# Patient Record
Sex: MALE | Race: WHITE | Employment: UNEMPLOYED | URBAN - METROPOLITAN AREA
[De-identification: names, ages, dates, MRNs, and addresses within clinical notes are randomized per-mention and may not be internally consistent; named-entity substitution may affect disease eponyms.]

---

## 2021-04-25 ENCOUNTER — IMMUNIZATION (OUTPATIENT)
Dept: LAB | Facility: HOSPITAL | Age: 46
End: 2021-04-25
Attending: EMERGENCY MEDICINE
Payer: MEDICAID

## 2021-04-25 DIAGNOSIS — Z23 NEED FOR VACCINATION: Primary | ICD-10-CM

## 2021-04-25 PROCEDURE — 0011A SARSCOV2 VAC 100MCG/0.5ML IM: CPT

## 2021-05-23 ENCOUNTER — IMMUNIZATION (OUTPATIENT)
Dept: LAB | Facility: HOSPITAL | Age: 46
End: 2021-05-23
Attending: EMERGENCY MEDICINE
Payer: MEDICAID

## 2021-05-23 DIAGNOSIS — Z23 NEED FOR VACCINATION: Primary | ICD-10-CM

## 2021-05-23 PROCEDURE — 0012A SARSCOV2 VAC 100MCG/0.5ML IM: CPT

## 2021-11-09 ENCOUNTER — APPOINTMENT (OUTPATIENT)
Dept: GENERAL RADIOLOGY | Facility: HOSPITAL | Age: 46
End: 2021-11-09
Attending: EMERGENCY MEDICINE
Payer: MEDICAID

## 2021-11-09 ENCOUNTER — HOSPITAL ENCOUNTER (EMERGENCY)
Facility: HOSPITAL | Age: 46
Discharge: LEFT BEFORE DISCHARGE | End: 2021-11-09
Attending: EMERGENCY MEDICINE
Payer: MEDICAID

## 2021-11-09 ENCOUNTER — HOSPITAL ENCOUNTER (INPATIENT)
Facility: HOSPITAL | Age: 46
LOS: 2 days | Discharge: HOME | DRG: 189 | End: 2021-11-11
Attending: EMERGENCY MEDICINE | Admitting: HOSPITALIST
Payer: MEDICAID

## 2021-11-09 VITALS
SYSTOLIC BLOOD PRESSURE: 145 MMHG | BODY MASS INDEX: 26.51 KG/M2 | OXYGEN SATURATION: 95 % | HEIGHT: 73 IN | RESPIRATION RATE: 41 BRPM | TEMPERATURE: 97 F | DIASTOLIC BLOOD PRESSURE: 92 MMHG | HEART RATE: 115 BPM | WEIGHT: 200 LBS

## 2021-11-09 VITALS
OXYGEN SATURATION: 93 % | TEMPERATURE: 98 F | WEIGHT: 200 LBS | HEART RATE: 118 BPM | BODY MASS INDEX: 26 KG/M2 | RESPIRATION RATE: 30 BRPM | DIASTOLIC BLOOD PRESSURE: 110 MMHG | SYSTOLIC BLOOD PRESSURE: 128 MMHG

## 2021-11-09 DIAGNOSIS — J45.901 ASTHMA WITH ACUTE EXACERBATION, UNSPECIFIED ASTHMA SEVERITY, UNSPECIFIED WHETHER PERSISTENT: Primary | ICD-10-CM

## 2021-11-09 DIAGNOSIS — J45.42 MODERATE PERSISTENT ASTHMA WITH STATUS ASTHMATICUS: Primary | ICD-10-CM

## 2021-11-09 DIAGNOSIS — Z53.29 LEFT AGAINST MEDICAL ADVICE: ICD-10-CM

## 2021-11-09 DIAGNOSIS — J45.901 SEVERE ASTHMA WITH EXACERBATION, UNSPECIFIED WHETHER PERSISTENT: Primary | ICD-10-CM

## 2021-11-09 PROCEDURE — 80048 BASIC METABOLIC PNL TOTAL CA: CPT | Performed by: EMERGENCY MEDICINE

## 2021-11-09 PROCEDURE — 5A09357 ASSISTANCE WITH RESPIRATORY VENTILATION, LESS THAN 24 CONSECUTIVE HOURS, CONTINUOUS POSITIVE AIRWAY PRESSURE: ICD-10-PCS | Performed by: HOSPITALIST

## 2021-11-09 PROCEDURE — 71045 X-RAY EXAM CHEST 1 VIEW: CPT | Performed by: EMERGENCY MEDICINE

## 2021-11-09 PROCEDURE — 96365 THER/PROPH/DIAG IV INF INIT: CPT

## 2021-11-09 PROCEDURE — 93010 ELECTROCARDIOGRAM REPORT: CPT | Performed by: EMERGENCY MEDICINE

## 2021-11-09 PROCEDURE — 96375 TX/PRO/DX INJ NEW DRUG ADDON: CPT

## 2021-11-09 PROCEDURE — 93005 ELECTROCARDIOGRAM TRACING: CPT

## 2021-11-09 PROCEDURE — 99222 1ST HOSP IP/OBS MODERATE 55: CPT | Performed by: HOSPITALIST

## 2021-11-09 PROCEDURE — 94640 AIRWAY INHALATION TREATMENT: CPT

## 2021-11-09 PROCEDURE — 85025 COMPLETE CBC W/AUTO DIFF WBC: CPT | Performed by: EMERGENCY MEDICINE

## 2021-11-09 PROCEDURE — 99285 EMERGENCY DEPT VISIT HI MDM: CPT

## 2021-11-09 PROCEDURE — 96372 THER/PROPH/DIAG INJ SC/IM: CPT

## 2021-11-09 PROCEDURE — 94660 CPAP INITIATION&MGMT: CPT

## 2021-11-09 PROCEDURE — 99223 1ST HOSP IP/OBS HIGH 75: CPT | Performed by: HOSPITALIST

## 2021-11-09 RX ORDER — GUAIFENESIN 100 MG/5ML
200 SOLUTION ORAL EVERY 4 HOURS PRN
Status: CANCELLED | OUTPATIENT
Start: 2021-11-09

## 2021-11-09 RX ORDER — METHYLPREDNISOLONE SODIUM SUCCINATE 125 MG/2ML
125 INJECTION, POWDER, LYOPHILIZED, FOR SOLUTION INTRAMUSCULAR; INTRAVENOUS ONCE
Status: COMPLETED | OUTPATIENT
Start: 2021-11-09 | End: 2021-11-09

## 2021-11-09 RX ORDER — MAGNESIUM SULFATE HEPTAHYDRATE 40 MG/ML
2 INJECTION, SOLUTION INTRAVENOUS ONCE
Status: COMPLETED | OUTPATIENT
Start: 2021-11-09 | End: 2021-11-09

## 2021-11-09 RX ORDER — IPRATROPIUM BROMIDE AND ALBUTEROL SULFATE 2.5; .5 MG/3ML; MG/3ML
3 SOLUTION RESPIRATORY (INHALATION) ONCE
Status: COMPLETED | OUTPATIENT
Start: 2021-11-09 | End: 2021-11-09

## 2021-11-09 RX ORDER — METHYLPREDNISOLONE SODIUM SUCCINATE 125 MG/2ML
INJECTION, POWDER, LYOPHILIZED, FOR SOLUTION INTRAMUSCULAR; INTRAVENOUS
Status: COMPLETED
Start: 2021-11-09 | End: 2021-11-09

## 2021-11-09 RX ORDER — PROCHLORPERAZINE EDISYLATE 5 MG/ML
5 INJECTION INTRAMUSCULAR; INTRAVENOUS EVERY 8 HOURS PRN
Status: CANCELLED | OUTPATIENT
Start: 2021-11-09

## 2021-11-09 RX ORDER — METHYLPREDNISOLONE SODIUM SUCCINATE 40 MG/ML
40 INJECTION, POWDER, LYOPHILIZED, FOR SOLUTION INTRAMUSCULAR; INTRAVENOUS EVERY 6 HOURS
Status: CANCELLED | OUTPATIENT
Start: 2021-11-09

## 2021-11-09 RX ORDER — ENOXAPARIN SODIUM 100 MG/ML
40 INJECTION SUBCUTANEOUS DAILY
Status: CANCELLED | OUTPATIENT
Start: 2021-11-09

## 2021-11-09 RX ORDER — IPRATROPIUM BROMIDE AND ALBUTEROL SULFATE 2.5; .5 MG/3ML; MG/3ML
3 SOLUTION RESPIRATORY (INHALATION) EVERY 6 HOURS PRN
Status: CANCELLED | OUTPATIENT
Start: 2021-11-09

## 2021-11-09 RX ORDER — TEMAZEPAM 15 MG/1
15 CAPSULE ORAL NIGHTLY PRN
Status: CANCELLED | OUTPATIENT
Start: 2021-11-09

## 2021-11-09 RX ORDER — IPRATROPIUM BROMIDE AND ALBUTEROL SULFATE 2.5; .5 MG/3ML; MG/3ML
3 SOLUTION RESPIRATORY (INHALATION) EVERY 6 HOURS
Status: CANCELLED | OUTPATIENT
Start: 2021-11-09

## 2021-11-09 RX ORDER — PREDNISONE 20 MG/1
40 TABLET ORAL DAILY
Qty: 10 TABLET | Refills: 0 | Status: SHIPPED | OUTPATIENT
Start: 2021-11-09 | End: 2021-11-11

## 2021-11-09 RX ORDER — ALBUTEROL SULFATE 90 UG/1
2 AEROSOL, METERED RESPIRATORY (INHALATION) EVERY 4 HOURS PRN
Qty: 8 G | Refills: 0 | Status: SHIPPED | OUTPATIENT
Start: 2021-11-09 | End: 2021-11-11

## 2021-11-09 RX ORDER — IPRATROPIUM BROMIDE AND ALBUTEROL SULFATE 2.5; .5 MG/3ML; MG/3ML
3 SOLUTION RESPIRATORY (INHALATION) ONCE
Status: DISCONTINUED | OUTPATIENT
Start: 2021-11-09 | End: 2021-11-09

## 2021-11-09 RX ORDER — ACETAMINOPHEN 325 MG/1
650 TABLET ORAL EVERY 6 HOURS PRN
Status: CANCELLED | OUTPATIENT
Start: 2021-11-09

## 2021-11-09 RX ORDER — ONDANSETRON 2 MG/ML
4 INJECTION INTRAMUSCULAR; INTRAVENOUS EVERY 6 HOURS PRN
Status: CANCELLED | OUTPATIENT
Start: 2021-11-09

## 2021-11-09 NOTE — ED PROVIDER NOTES
Patient Seen in: [facility] Emergency Department      History   Patient presents with:  Difficulty Breathing    Stated Complaint: asthma    Subjective:   HPI    Patient is a 39-year-old male who arrives by EMS for asthma exacerbation that occurred Osmolality 297 (*)     All other components within normal limits   CBC W/ DIFFERENTIAL - Abnormal; Notable for the following components:    WBC 15.0 (*)     RBC 5.95 (*)     Neutrophil Absolute Prelim 12.26 (*)     Neutrophil Absolute 12.26 (*)     All oth magnesium IV. Reassessment-air entry to b/l lung fields improved but significant wheezing now heard. D/w patient admission and pt refuses. States he always gets epinephrine in past and then goes home.  He understands that he is leaving AMA and will go h

## 2021-11-09 NOTE — H&P
[facility]    PATIENT'S NAME: Mojganasmita Peñao   ATTENDING PHYSICIAN: Radha Simon.  Viktor Fajardo MD   PATIENT ACCOUNT#:   [id number]    LOCATION:  [place] [address]  MEDICAL RECORD #:   [id number]       YOB: 1975  ADMISSION DATE:       11/09/2021 treatment. HEENT:  Atraumatic. Oropharynx clear. Dry mucous membranes. Ears, nose normal.  Eyes:  Anicteric sclerae. Pupils equal, round, reactive. NECK:  Supple. No lymphadenopathy. Trachea midline. Full range of motion.      LUNGS:  Bilateral e

## 2021-11-09 NOTE — ED QUICK NOTES
Orders for admission, patient is aware of plan and ready to go upstairs. Any questions, please call ED RN Erie Rinne  at extension [phone].    Type of COVID test sent:rapid  COVID Suspicion level: High    Titratable drug(s) infusing:  Rate:    LOC at time of trans

## 2021-11-09 NOTE — ED INITIAL ASSESSMENT (HPI)
Irina arrives via [facility] from home, patient with asthma exacerbation since 10 pm. Tripoding upon arrival, 2 albuterol nebs given by medics, patient used his inhaler 10 times at home. Patient only able to speak in 1 word sentences.

## 2021-11-09 NOTE — ED PROVIDER NOTES
He was  Patient Seen in: [facility] Emergency Department    History   Patient presents with:  Asthma    Stated Complaint: Asthma    HPI    Patient presents to the emergency department by ambulance.  He was seen here last night on the night shift he h (36.3 °C)   Temp src 11/09/21 1600 Skin   SpO2 11/09/21 1553 96 %   O2 Device 11/09/21 1553 None (Room air)       Current:BP (!) 152/95   Pulse 118   Temp 97.4 °F (36.3 °C) (Skin)   Resp 13   Ht 185.4 cm (6' 1\")   Wt 90.7 kg   SpO2 100%   BMI 26.39 kg/m² life-threatening. He is currently saturating 93% on room air he understood everything I said and acknowledge this.   He still wanted to leave [address] I strongly encouraged him to have close follow-up I discussed to return immediately with an MD  [address] [facility]  [phone]    In 1 day  For re-check      Medications Prescribed:  Current Discharge Medication List    START taking these medications    fluticasone propionate 110 MCG/ACT Inhalation Aerosol  Inhale 1 puf

## 2021-11-09 NOTE — ED QUICK NOTES
Orders for admission, patient is aware of plan and ready to go upstairs. Any questions, please call ED DEYSI jones  at extension [phone].    Type of COVID test sent:rapid  COVID Suspicion level: Low      LOC at time of transport:a/ox4    Other pertinent informat

## 2021-11-09 NOTE — ED QUICK NOTES
Pt requesting to leave AMA. Pt states he is feeling better and does not want to stay in the hospital. RT called to dc BIPAP. EPI IM given per md order. This RN explained the importance of staying on the monitor prior to epi administration but pt refused.  P

## 2021-11-10 PROBLEM — J45.901 ASTHMA WITH ACUTE EXACERBATION, UNSPECIFIED ASTHMA SEVERITY, UNSPECIFIED WHETHER PERSISTENT: Status: ACTIVE | Noted: 2021-11-10

## 2021-11-10 PROBLEM — J45.42 MODERATE PERSISTENT ASTHMA WITH STATUS ASTHMATICUS: Status: ACTIVE | Noted: 2021-11-10

## 2021-11-10 PROBLEM — J45.901 ASTHMA ATTACK (HCC): Status: ACTIVE | Noted: 2021-11-10

## 2021-11-10 PROBLEM — J45.901 SEVERE ASTHMA WITH EXACERBATION, UNSPECIFIED WHETHER PERSISTENT: Status: ACTIVE | Noted: 2021-11-10

## 2021-11-10 PROBLEM — J45.901 SEVERE ASTHMA WITH EXACERBATION, UNSPECIFIED WHETHER PERSISTENT (HCC): Status: ACTIVE | Noted: 2021-11-10

## 2021-11-10 PROBLEM — J45.901 ASTHMA WITH ACUTE EXACERBATION, UNSPECIFIED ASTHMA SEVERITY, UNSPECIFIED WHETHER PERSISTENT (HCC): Status: ACTIVE | Noted: 2021-11-10

## 2021-11-10 PROBLEM — J45.42 MODERATE PERSISTENT ASTHMA WITH STATUS ASTHMATICUS (HCC): Status: ACTIVE | Noted: 2021-11-10

## 2021-11-10 PROBLEM — J45.901 ASTHMA ATTACK: Status: ACTIVE | Noted: 2021-11-10

## 2021-11-10 PROCEDURE — 99223 1ST HOSP IP/OBS HIGH 75: CPT | Performed by: INTERNAL MEDICINE

## 2021-11-10 PROCEDURE — 99233 SBSQ HOSP IP/OBS HIGH 50: CPT | Performed by: HOSPITALIST

## 2021-11-10 RX ORDER — IPRATROPIUM BROMIDE AND ALBUTEROL SULFATE 2.5; .5 MG/3ML; MG/3ML
3 SOLUTION RESPIRATORY (INHALATION) EVERY 6 HOURS
Status: DISCONTINUED | OUTPATIENT
Start: 2021-11-10 | End: 2021-11-10

## 2021-11-10 RX ORDER — TEMAZEPAM 15 MG/1
15 CAPSULE ORAL NIGHTLY PRN
Status: DISCONTINUED | OUTPATIENT
Start: 2021-11-10 | End: 2021-11-11

## 2021-11-10 RX ORDER — ONDANSETRON 2 MG/ML
4 INJECTION INTRAMUSCULAR; INTRAVENOUS EVERY 6 HOURS PRN
Status: DISCONTINUED | OUTPATIENT
Start: 2021-11-10 | End: 2021-11-11

## 2021-11-10 RX ORDER — ENOXAPARIN SODIUM 100 MG/ML
40 INJECTION SUBCUTANEOUS DAILY
Status: DISCONTINUED | OUTPATIENT
Start: 2021-11-10 | End: 2021-11-11

## 2021-11-10 RX ORDER — IPRATROPIUM BROMIDE AND ALBUTEROL SULFATE 2.5; .5 MG/3ML; MG/3ML
3 SOLUTION RESPIRATORY (INHALATION) EVERY 6 HOURS PRN
Status: DISCONTINUED | OUTPATIENT
Start: 2021-11-10 | End: 2021-11-11

## 2021-11-10 RX ORDER — GUAIFENESIN 100 MG/5ML
200 SOLUTION ORAL EVERY 4 HOURS PRN
Status: DISCONTINUED | OUTPATIENT
Start: 2021-11-10 | End: 2021-11-11

## 2021-11-10 RX ORDER — LORAZEPAM 2 MG/ML
0.5 CONCENTRATE ORAL EVERY 8 HOURS PRN
Status: DISCONTINUED | OUTPATIENT
Start: 2021-11-10 | End: 2021-11-11

## 2021-11-10 RX ORDER — PROCHLORPERAZINE EDISYLATE 5 MG/ML
5 INJECTION INTRAMUSCULAR; INTRAVENOUS EVERY 8 HOURS PRN
Status: DISCONTINUED | OUTPATIENT
Start: 2021-11-10 | End: 2021-11-11

## 2021-11-10 RX ORDER — METHYLPREDNISOLONE SODIUM SUCCINATE 40 MG/ML
40 INJECTION, POWDER, LYOPHILIZED, FOR SOLUTION INTRAMUSCULAR; INTRAVENOUS EVERY 6 HOURS
Status: DISCONTINUED | OUTPATIENT
Start: 2021-11-10 | End: 2021-11-11

## 2021-11-10 RX ORDER — ACETAMINOPHEN 325 MG/1
650 TABLET ORAL EVERY 6 HOURS PRN
Status: DISCONTINUED | OUTPATIENT
Start: 2021-11-10 | End: 2021-11-11

## 2021-11-10 NOTE — PROGRESS NOTES
Patient transferred to room 539, skin check done with second RN Ieva. Skin intact, no skin issues identified.

## 2021-11-10 NOTE — PLAN OF CARE
Patient arrived on floor on a high flow nasal cannula. Expiratory wheezes noted. With rest patients HR, BP, and RR decreased. Will continue to monitor.    Problem: Patient Centered Care  Goal: Patient preferences are identified and integrated in the patient Monitor vital signs, rhythm, and trends  - Monitor for bleeding, hypotension and signs of decreased cardiac output  - Evaluate effectiveness of vasoactive medications to optimize hemodynamic stability  - Monitor arterial and/or venous puncture sites for bl appropriate  - Administer supportive blood products/factors as ordered and appropriate  Outcome: Progressing  Goal: Free from bleeding injury  Description: (Example usage: patient with low platelets)  INTERVENTIONS:  - Avoid intramuscular injections, enema

## 2021-11-10 NOTE — ED PROVIDER NOTES
Patient Seen in: [facility] AND [facility] Emergency Department      History   Patient presents with:  Difficulty Breathing    Stated Complaint: asthma     Subjective:   HPI    25-year-old male with history of asthma presents with complaints of dyspnea and asthm above.    Physical Exam     ED Triage Vitals   BP 11/09/21 2145 (!) 144/108   Pulse 11/09/21 2140 (!) 137   Resp 11/09/21 2140 (!) 28   Temp 11/09/21 2140 97.6 °F (36.4 °C)   Temp src 11/09/21 2140 Oral   SpO2 11/09/21 2140 95 %   O2 Device 11/09/21 2140 N follow-up provider specified.         Medications Prescribed:  Current Discharge Medication List                          Hospital Problems             Present on Admission  Date Reviewed: 1/5/2015          ICD-10-CM Noted POA    * (Principal) Severe asthma

## 2021-11-10 NOTE — PLAN OF CARE
Patient alert,oriented x4, ambulatory in room, assistance only to manage monitors. Steady with ambulation, has been on room air for past 2 hours with oxygen sat's 94-96%. Education provided on inhaler ordered Breo.  Informed on plan of care, medications ord baseline  Description: INTERVENTIONS:  - Continuous cardiac monitoring, monitor vital signs, obtain 12 lead EKG if indicated  - Evaluate effectiveness of antiarrhythmic and heart rate control medications as ordered  - Initiate emergency measures for life t Administer supportive blood products/factors, fluids and medications as ordered and appropriate  - Administer supportive blood products/factors as ordered and appropriate  Outcome: Completed  Goal: Free from bleeding injury  Description: (Example usage: pa

## 2021-11-10 NOTE — ED QUICK NOTES
Upon leaving for discharge patients lungs wheezy and diminished, hoarse voice. Pt leaving AMA. AMA paperwork in the chart. Pt educated on the risks of leaving AMA.

## 2021-11-10 NOTE — CONSULTS
[facility] - [facility]    Report of Consultation    [place] Patient Status:  Inpatient    10/4/1975 MRN [id number]   Location [facility] 2W/SW Attending Favian Hopper MD   Hosp Day # 0 PCP Michelle Segovia MD     Date of Admiss Drug use: No    Allergies/Medications:    Allergies:   Cat Hair Extract            Comment:Other reaction(s): CAT HAIR STD EXTRACT  Mold                        Comment:Other reaction(s): MOLD EXTRACTS  predniSONE 20 MG Oral Tab, Take 2 tablets (40 mg total) is no rebound and no guarding. Lymphadenopathy:     He has no cervical adenopathy. Neurological: He is oriented to person, place, and time. No sensory deficit or motor deficit. Skin: Skin is dry. Psychiatric: His mood appears anxious.        Results steroid and then changed to p.o. steroid taper course  ICS/LABA inhaler maintenance therapy   Nebulizers and albuterol inhaler   Avoid triggers  Follow-up in pulmonary clinic after discharge      2- DVT prophylaxis   Lovenox subcutaneous         Ahmad O.  R

## 2021-11-10 NOTE — ED INITIAL ASSESSMENT (HPI)
Pt presents to ED for an asthma exacerbation. Pt seen twice today and left AMA. EMS gave 1 albuterol en route.

## 2021-11-10 NOTE — RESPIRATORY THERAPY NOTE
Noted in [facility] that patient has already received first dose of Arnuity. Steps reviewed with patient, patient was able to provide teach back. Reviewed purpose of inhaler. Charted in education.

## 2021-11-10 NOTE — H&P
[address] Patient Status:  Inpatient    10/4/1975 MRN [id number]   Location [facility] 2W/SW Attending Vaishali Lambert MD   Hosp Day # 0 PCP Morris Resendez MD     Date:  11/10/2021  Uriah Comment:Other reaction(s): MOLD EXTRACTS    Home Medications:  Prior to Admission Medications   Prescriptions Last Dose Informant Patient Reported? Taking?    Albuterol Sulfate HFA (VENTOLIN HFA) 108 (90 BASE) MCG/ACT Inhalation Aero Soln 11/10/2021 at Normal rate, regular rhythm, no murmur, no edema. Gastrointestinal:  Soft, non-tender, non-distended, normal bowel sounds, no organomegaly. Lymphatics:  No lymphadenopathy neck, axilla, groin. Musculoskeletal: Normal range of motion. normal strength.

## 2021-11-11 VITALS
HEART RATE: 101 BPM | OXYGEN SATURATION: 91 % | RESPIRATION RATE: 14 BRPM | SYSTOLIC BLOOD PRESSURE: 148 MMHG | DIASTOLIC BLOOD PRESSURE: 98 MMHG | WEIGHT: 199.94 LBS | TEMPERATURE: 98 F | BODY MASS INDEX: 26 KG/M2

## 2021-11-11 PROCEDURE — 99239 HOSP IP/OBS DSCHRG MGMT >30: CPT | Performed by: HOSPITALIST

## 2021-11-11 PROCEDURE — 99233 SBSQ HOSP IP/OBS HIGH 50: CPT | Performed by: PHYSICIAN ASSISTANT

## 2021-11-11 RX ORDER — ALBUTEROL SULFATE 2.5 MG/3ML
2.5 SOLUTION RESPIRATORY (INHALATION) EVERY 6 HOURS PRN
Qty: 120 EACH | Refills: 0 | Status: SHIPPED | OUTPATIENT
Start: 2021-11-11

## 2021-11-11 RX ORDER — PREDNISONE 20 MG/1
TABLET ORAL
Qty: 21 TABLET | Refills: 0 | Status: SHIPPED | OUTPATIENT
Start: 2021-11-12

## 2021-11-11 NOTE — CM/SW NOTE
SW received MDO for home health, nebulizer order. Patient was discussed during rounds. Patient lives at home and is independent with ADLs/IADLs. No HH needs indicated at this time. SW entered order for nebulizer and obtained cosign.  Order and referral s

## 2021-11-11 NOTE — PLAN OF CARE
Problem: Patient Centered Care  Goal: Patient preferences are identified and integrated in the patient's plan of care  Description: Interventions:  - What would you like us to know as we care for you?  I just want to go home  - Provide timely, complete, a measures for life threatening arrhythmias  - Monitor electrolytes and administer replacement therapy as ordered  Outcome: Completed     Problem: RESPIRATORY - ADULT  Goal: Achieves optimal ventilation and oxygenation  Description: INTERVENTIONS:  - Assess

## 2021-11-11 NOTE — PROGRESS NOTES
[facility]    Progress Note    José Miguel Dunham Patient Status:  Inpatient    10/4/1975 MRN [id number]   Location [facility] 5SW/SE Attending Kahlil Ballesteros MD   Hosp Day # 0 PCP Ronal Martinez MD       Subjective:      Much physical, he is agreeable)     Disposition: likely home tomorrow    Chart reviewed  D/w pt at length  D/w RN  Greater than 35 minutes spent, >50% spent counseling and coordinating of care as outlined above.     Results:     Lab Results   Component Value Uriah

## 2021-11-11 NOTE — PROGRESS NOTES
Discharge RN Summary: Patient has discharge order in. Patient to discharge home with mother. IV removed by this RN R.AC. Understands to follow up with PCP in 1 week and pulm 2 weeks. Patient understands to  meds from pharmacy.  Neb  by

## 2021-11-11 NOTE — PROGRESS NOTES
[facility]    Progress Note    Audrarachaelcara Chakraborty Patient Status:  Inpatient    10/4/1975 MRN [id number]   Location [facility] 5SW/SE Attending No att. providers found   [facility] Day # 1 PCP Simona Maravilla MD     Subjective:   Seen a Nilton Alberts.       Assessment & Plan:   # Asthma exacerbation  -Asthma since childhood, always worse in fall  -No evidence of infection  -CXR with hyperinflation, otherwise clear  -Signed out AMA twice from ED then came back third time for admission  -Brief

## 2021-11-11 NOTE — PLAN OF CARE
Madi transferred this afternoon from CCU. Solumedrol given late due to transfer, pharmacy to adjust times. No complaints.    Problem: Patient Centered Care  Goal: Patient preferences are identified and integrated in the patient's plan of care  Descriptio 12 lead EKG if indicated  - Evaluate effectiveness of antiarrhythmic and heart rate control medications as ordered  - Initiate emergency measures for life threatening arrhythmias  - Monitor electrolytes and administer replacement therapy as ordered  Outcom

## 2021-11-12 NOTE — PAYOR COMM NOTE
ADMITTED TO ICU 11/10  LATER TRANSFERRED OUT TO MEDICAL FLOOR 11/10    ADMISSION REVIEW     Payor: [address] [place] [address] #:  [id number]  Authorization Number: [id number]    Admit date: 11/10/21  Admit time: 12:21 AM       REVIEW DOCUMENTATION repair 1998    bilateral knee repairs ACL              Past Surgical History:   Procedure Laterality Date   • KNEE SURGERY Bilateral     arthroscopy x2 each knee                Social History    Tobacco Use      Smoking status: Never Smoker      Smokeless and give nebulizer treatments. No additional steroids will be given at this time as he has gotten 2 doses of methylprednisolone today. Will admit to PCU for close monitoring. Discussed with Dr. Endy Mclaughlin, pulmonology.   Also discussed with Dr. Randolph Nguyen home fell asleep woke up experiencing severe shortness of breath and tightness along with wheezing. Denies any fever chills denies any significant cough rhinorrhea or sore throat.   Claims he tends to have asthma exacerbations at the change of the season u Take 2 tablets (40 mg total) by mouth daily for 5 days. Facility-Administered Medications: None       Review of Systems:  Constitutional:  Weakness, Fatigue. Eye:  Negative. Ear/Nose/Mouth/Throat:  Negative.   Respiratory: Shortness of breath and whe 11/10/2021    CO2 28.0 11/10/2021     11/10/2021    CA 9.5 11/10/2021       Imaging:  XR CHEST AP PORTABLE  (CPT=71045)    Result Date: 11/9/2021  CONCLUSION:  1. Question hyperinflation otherwise unremarkable examination.  No significant change has °C) 113 14 153/92 92 % — None (Room air) — KK    11/11/21 0346 97.6 °F (36.4 °C) 88 18 133/83 95 % — None (Room air) — AB    11/10/21 2040 97.9 °F (36.6 °C) 95 20 138/89 92 % — None (Room air) — AB    11/10/21 1449 98.2 °F (36.8 °C) 95 22 142/75 93 % — Non giving another continuous nebulizers and steroid and admitted to PCU and he seems better now after he received a steroid and the nebulizers    Respiratory: Positive for cough, chest tightness, shortness of breath and wheezing.       Slightly barrel-shaped c intact.   Neck: no adenopathy, no carotid bruit, no JVD, supple  Pulmonary:  Diminished, wheezing to auscultation bilaterally  Cardiovascular: S1, S2 normal, no murmur, click, rub or gallop, regular rate and rhythm  Abdominal: soft, non-tender; bowel sounds (36.4 °C), temperature source Oral, resp. rate 14, weight 199 lb 15.3 oz (90.7 kg), SpO2 91 %.     Nursing note and vitals reviewed. Constitutional: He is oriented to person, place, and time. He appears well-developed and well-nourished.  He is cooperativ

## 2021-12-01 NOTE — DISCHARGE SUMMARY
[facility] HOSPITALIST  DISCHARGE SUMMARY     Tierra Pemberton Patient Status:  Inpatient    10/4/1975 MRN [id number]   Location [facility] 5SW/SE Attending No att. providers found   Hosp Day # 2 PCP Wilmer Hamilton MD     Date of Admission:  treatment  Pt is agreeable to stay overnight     Prophylaxis  Subcutaneous heparin, p.o.  Protonix     CODE STATUS  Full     Primary care physician  Chula Rivers MD (although pt has not seen her in over 7 years, I encouraged pt to schedule a regular f take half tablet (10 mg) for 3 days.    Quantity: 21 tablet  Refills: 0        STOP taking these medications    albuterol 108 (90 Base) MCG/ACT Aers  Replaced by: albuterol (2.5 MG/3ML) 0.083% Nebu  You also have another medication with the same name that y Ricardo Elliott MD 11/30/2021    Time spent:  > 30 minutes

## 2024-04-10 NOTE — PLAN OF CARE
Discharge Note  Short Stay      SUMMARY     Admit Date: 4/10/2024    Attending Physician: Samuel Jimenez MD    Discharge Physician: Scott Brizuela MD      Discharge Date: 4/10/2024 10:03 AM    Procedure(s) (LRB):  INJECTION, JOINT RIGHT SI AND RIGHT PIRIFORMIS (Right)    Final Diagnosis: Sacroiliitis [M46.1]    Disposition: Home or self care    Patient Instructions:   Current Discharge Medication List        CONTINUE these medications which have NOT CHANGED    Details   ascorbic acid, vitamin C, (VITAMIN C) 1000 MG tablet Take 1,000 mg by mouth.      aspirin (ECOTRIN) 81 MG EC tablet Take 81 mg by mouth once daily.      celecoxib (CELEBREX) 200 MG capsule       DULoxetine (CYMBALTA) 30 MG capsule TAKE 1 CAPSULE(30 MG) BY MOUTH TWICE DAILY  Qty: 60 capsule, Refills: 2      ergocalciferol (DRISDOL) 200 mcg/mL (8,000 unit/mL) Drop Take by mouth.      ergocalciferol, vitamin D2, (VITAMIN D ORAL) Take by mouth every 30 days.      finasteride (PROSCAR) 5 mg tablet Take 1 tablet by mouth once daily.      HYDROcodone-acetaminophen (NORCO)  mg per tablet Take 1 tablet by mouth every 8 (eight) hours as needed for Pain.  Qty: 90 tablet, Refills: 0    Comments: Quantity prescribed more than 7 day supply? Yes, quantity medically necessary      irbesartan (AVAPRO) 75 MG tablet 150 mg once daily.       levothyroxine (SYNTHROID) 100 MCG tablet Take 100 mcg by mouth.      meloxicam (MOBIC) 15 MG tablet Take 15 mg by mouth.      mirabegron (MYRBETRIQ ORAL) Take by mouth.      simvastatin (ZOCOR) 20 MG tablet Take 20 mg by mouth every evening.      tadalafil (CIALIS) 20 MG Tab       temazepam (RESTORIL) 30 mg capsule TK 1 C PO QD HS                 Discharge Diagnosis: Sacroiliitis [M46.1]  Condition on Discharge: Stable with no complications to procedure   Diet on Discharge: Same as before.  Activity: as per instruction sheet.  Discharge to: Home with a responsible adult.  Follow up: 2-4 weeks       Please call my office  Vital signs stable, lung sounds clear, hopeful to go home tomorrow. Pt receiving IV solumedrol Q6. Pt able to verbalize needs, call light within reach. Frequent rounding by nursing staff.      Problem: Patient Centered Care  Goal: Patient preferences are id indicated  - Evaluate effectiveness of antiarrhythmic and heart rate control medications as ordered  - Initiate emergency measures for life threatening arrhythmias  - Monitor electrolytes and administer replacement therapy as ordered  Outcome: Progressing or pager at [phone] if experienced any weakness or loss of sensation, fever > 101.5, pain uncontrolled with oral medications, persistent nausea/vomiting/or diarrhea, redness or drainage from the incisions, or any other worrisome concerns. If physician on call was not reached or could not communicate with our office for any reason please go to the nearest emergency department      Scott Brizuela M.D.  PGY-5  Interventional Pain Management Fellow  [facility]  Pager: [phone]    04/10/2024

## 2025-08-14 RX ORDER — ALBUTEROL SULFATE 0.83 MG/ML
2.5 SOLUTION RESPIRATORY (INHALATION) EVERY 6 HOURS PRN
Qty: 120 EACH | Refills: 0 | OUTPATIENT
Start: 2025-08-14

## (undated) NOTE — LETTER
Date & Time: 11/9/2021, 5:21 AM  Patient: Rebeca  Encounter Provider(s):    Issac Morales MD         This certifies that Catalina Hernandez, a patient at an [facility] facility, am leaving the facility voluntarily and against th

## (undated) NOTE — LETTER
Date & Time: 11/9/2021, 6:20 PM  Patient: Cathleen Bolivar  Encounter Provider(s):    Briseyda Seymour MD         This certifies that Cherie Nichols, a patient at an [facility], am leaving the facility voluntarily and against t